# Patient Record
Sex: FEMALE | Race: ASIAN | NOT HISPANIC OR LATINO | URBAN - METROPOLITAN AREA
[De-identification: names, ages, dates, MRNs, and addresses within clinical notes are randomized per-mention and may not be internally consistent; named-entity substitution may affect disease eponyms.]

---

## 2019-04-25 ENCOUNTER — EMERGENCY (EMERGENCY)
Facility: HOSPITAL | Age: 84
LOS: 1 days | Discharge: HOME | End: 2019-04-25
Attending: EMERGENCY MEDICINE | Admitting: EMERGENCY MEDICINE
Payer: SELF-PAY

## 2019-04-25 VITALS
OXYGEN SATURATION: 99 % | RESPIRATION RATE: 18 BRPM | DIASTOLIC BLOOD PRESSURE: 73 MMHG | HEART RATE: 52 BPM | TEMPERATURE: 98 F | SYSTOLIC BLOOD PRESSURE: 172 MMHG

## 2019-04-25 PROCEDURE — 99283 EMERGENCY DEPT VISIT LOW MDM: CPT | Mod: 25

## 2019-04-25 PROCEDURE — 99053 MED SERV 10PM-8AM 24 HR FAC: CPT

## 2019-04-25 NOTE — ED ADULT TRIAGE NOTE - CHIEF COMPLAINT QUOTE
Pt Laurent speaking; requests son to translate. Pt c/o dizziness beginning at 5pm today. Pt just arrived from [place] 5 days ago to visit son, has hx of HTN, compliant with meds. Pt admits to eating fried foods for lunch which were heavily salted.  Pt denies CP/SOB. Pts daughter took pts BP at home with 211/95 then took "Misart-40".

## 2019-04-26 NOTE — ED PROVIDER NOTE - NSFOLLOWUPCLINICS_GEN_ALL_ED_FT
[facility] General Internal Medicine  General Internal Medicine  [address] [id number]  Phone: [phone]  Fax:   Follow Up Time:

## 2019-04-26 NOTE — ED PROVIDER NOTE - PHYSICAL EXAMINATION
*GEN:   comfortable, in no acute distress, AOx3  *EYES:   pupils equally round and reactive to light, extra-occular movements intact  *HEENT:   airway patent, moist mucosal membranes  *CV:   regular rate and rhythm  *RESP:   clear to auscultation bilaterally, non-labored  *ABD:   soft, non-tender  *:   no cva/flank tenderness  *MSK:   no MSK tenderness or limited ROM  *SKIN:   dry, intact  *NEURO:   AOx3, cranial nerves intact throughout, strength 5/5, no focal loss of sensation, no pronator drift, finger/nose normal, ambulating w/ normal gait

## 2019-04-26 NOTE — ED PROVIDER NOTE - NSFOLLOWUPINSTRUCTIONS_ED_ALL_ED_FT
Follow up with a primary care doctor within the next 3-5 days for re-evaluation and continued care.   Please take your blood pressure medicines as prescribed - three times a day.

## 2019-04-26 NOTE — ED PROVIDER NOTE - ATTENDING CONTRIBUTION TO CARE
MD Benítez:  I performed a face to face bedside interview with patient regarding history of present illness, review of symptoms and past medical history. I completed an independent physical exam(documented below).  I have discussed patient's plan of care with resident.   I agree with note as stated above, having amended the EMR as needed to reflect my findings. I have discussed the assessment and plan of care.  This includes during the time I functioned as the attending physician for this patient.  PE:  Gen: Alert, NAD  Head: NC, AT,  EOMI, normal lids/conjunctiva  ENT:  normal hearing, patent oropharynx without erythema/exudate  Neck: +supple, no tenderness/meningismus/JVD, +Trachea midline  Chest: no chest wall tenderness, equal chest rise  Pulm: Bilateral BS, normal resp effort, no wheeze/stridor/retractions  CV: RRR, no M/R/G, +dist pulses  Abd: +BS, soft, NT/ND  Rectal: deferred  Mskel: no edema/erythema/cyanosis  Skin: no rash  Neuro: AAOx3, no sensory/motor deficits, CN 2-12 intact, normal finger to nose test, negative rhomberg, normal gait  MDM:  84yo F w/ pmh of htn (denies hx of CVA despite resident's mention of this, pt and her son clarified and stated she has never had a stroke or TIA), presents c/o episode of room-spinning dizziness last night associated w/ high BP (systolic over 200). Pt is supposed to be on talmisartan TID but has only been taking it once a day for unknown amount of time. She took one dose ystdy afternoon, and a second dose last night after symptoms began. Symptoms resolved and BP decreased within a couple of hours and pt has not been asymptomatic for hours. Denies headache, cp, sob, diaphoresis, palpitations, lightheadedness before/during/or after episode of room spinning. No complaints at this time. Not on AC, no recent falls or head trauma. No focal neuro symptoms and no deficits on exam. Explained need to follow medication instructions and take appropriate doses. Pt has recently emigrated from [place], but son stating they will find her a PMD in [place] within the next week for pt to f/u with. Educated on reasons for which to return.

## 2019-04-26 NOTE — ED PROVIDER NOTE - CLINICAL SUMMARY MEDICAL DECISION MAKING FREE TEXT BOX
85F w/ htn, vertigo both since resolved - clear history of similar association in past, currently asymptomatic, well appearing, with good family support 85F w/ htn, vertigo both since resolved - clear history of similar association in past, currently asymptomatic, well appearing, with good family support; will dc w/ outpt pcp f/u. Only taking her TID bp med once a day.

## 2019-04-26 NOTE — ED PROVIDER NOTE - OBJECTIVE STATEMENT
85F w/ pmh HTN, h/o CVA - p/w elevated BP and vertigo. Sx started 5pm today - checked bp and was [id number] at home, took extra dose of her usual talmisartan 40mg and vertigo resolved, bp improved to 170s. Currently has no complaints - no dizziness, no pain, fevers, other complaints. No recent illness, or hospitalization. Visiting from [place], came here last week, no pcp here.    PCP: none here in ED

## 2019-04-26 NOTE — ED ADULT NURSE NOTE - OBJECTIVE STATEMENT
Patient brought into room15. a&ox4 female presents to the ed today for HTN Laurent speaking female presents to the ed with family at bedside, speaking clear and full sentences states she has been hypertensive. States she checked her BP today and it was In the 200s and she took an extra dose of her at home medication. Patient states she is feeling better and in NAD. PMH: CVA. PT visiting from [place].